# Patient Record
Sex: MALE | Race: WHITE | NOT HISPANIC OR LATINO | URBAN - METROPOLITAN AREA
[De-identification: names, ages, dates, MRNs, and addresses within clinical notes are randomized per-mention and may not be internally consistent; named-entity substitution may affect disease eponyms.]

---

## 2017-05-28 ENCOUNTER — EMERGENCY (EMERGENCY)
Facility: HOSPITAL | Age: 37
LOS: 0 days | Discharge: HOME | End: 2017-05-28

## 2017-06-28 DIAGNOSIS — Z41.1 ENCOUNTER FOR COSMETIC SURGERY: ICD-10-CM

## 2017-06-28 DIAGNOSIS — Z90.89 ACQUIRED ABSENCE OF OTHER ORGANS: ICD-10-CM

## 2017-06-28 DIAGNOSIS — Z79.899 OTHER LONG TERM (CURRENT) DRUG THERAPY: ICD-10-CM

## 2017-06-28 DIAGNOSIS — Z20.6 CONTACT WITH AND (SUSPECTED) EXPOSURE TO HUMAN IMMUNODEFICIENCY VIRUS [HIV]: ICD-10-CM

## 2017-06-28 DIAGNOSIS — R07.0 PAIN IN THROAT: ICD-10-CM

## 2018-06-01 ENCOUNTER — TRANSCRIPTION ENCOUNTER (OUTPATIENT)
Age: 38
End: 2018-06-01

## 2019-02-04 ENCOUNTER — TRANSCRIPTION ENCOUNTER (OUTPATIENT)
Age: 39
End: 2019-02-04

## 2019-05-29 ENCOUNTER — TRANSCRIPTION ENCOUNTER (OUTPATIENT)
Age: 39
End: 2019-05-29

## 2019-06-27 ENCOUNTER — TRANSCRIPTION ENCOUNTER (OUTPATIENT)
Age: 39
End: 2019-06-27

## 2020-11-04 ENCOUNTER — TRANSCRIPTION ENCOUNTER (OUTPATIENT)
Age: 40
End: 2020-11-04

## 2020-11-04 ENCOUNTER — EMERGENCY (EMERGENCY)
Facility: HOSPITAL | Age: 40
LOS: 0 days | Discharge: HOME | End: 2020-11-04
Attending: EMERGENCY MEDICINE | Admitting: EMERGENCY MEDICINE
Payer: COMMERCIAL

## 2020-11-04 VITALS
HEART RATE: 80 BPM | SYSTOLIC BLOOD PRESSURE: 114 MMHG | TEMPERATURE: 96 F | OXYGEN SATURATION: 99 % | RESPIRATION RATE: 16 BRPM | DIASTOLIC BLOOD PRESSURE: 79 MMHG

## 2020-11-04 DIAGNOSIS — Z88.0 ALLERGY STATUS TO PENICILLIN: ICD-10-CM
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
## 2020-11-04 DIAGNOSIS — M54.2 CERVICALGIA: ICD-10-CM

## 2020-11-04 DIAGNOSIS — M62.838 OTHER MUSCLE SPASM: ICD-10-CM

## 2020-11-04 DIAGNOSIS — S16.1XXA STRAIN OF MUSCLE, FASCIA AND TENDON AT NECK LEVEL, INITIAL ENCOUNTER: ICD-10-CM
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
## 2020-11-04 PROCEDURE — 99284 EMERGENCY DEPT VISIT MOD MDM: CPT

## 2020-11-04 RX ORDER — IBUPROFEN 200 MG
1 TABLET ORAL
Qty: 42 | Refills: 0
Start: 2020-11-04 | End: 2020-11-17

## 2020-11-04 RX ORDER — KETOROLAC TROMETHAMINE 30 MG/ML
30 SYRINGE (ML) INJECTION ONCE
Refills: 0 | Status: DISCONTINUED | OUTPATIENT
Start: 2020-11-04 | End: 2020-11-04

## 2020-11-04 RX ORDER — METHOCARBAMOL 500 MG/1
1 TABLET, FILM COATED ORAL
Qty: 21 | Refills: 0
Start: 2020-11-04 | End: 2020-11-10

## 2020-11-04 RX ORDER — METHOCARBAMOL 500 MG/1
1000 TABLET, FILM COATED ORAL ONCE
Refills: 0 | Status: COMPLETED | OUTPATIENT
Start: 2020-11-04 | End: 2020-11-04

## 2020-11-04 RX ADMIN — Medication 30 MILLIGRAM(S): at 12:11

## 2020-11-04 RX ADMIN — METHOCARBAMOL 1000 MILLIGRAM(S): 500 TABLET, FILM COATED ORAL at 12:11

## 2020-11-04 NOTE — ED PROVIDER NOTE - NSFOLLOWUPINSTRUCTIONS_ED_ALL_ED_FT
Cervical Sprain     A cervical sprain is a stretch or tear in the tissues that connect bones (ligaments) in the neck. Most neck (cervical) sprains get better in 4–6 weeks.  Follow these instructions at home:  If you have a neck collar:     Wear it as told by your doctor. Do not take off (do not remove) the collar unless your doctor says that this is safe.Ask your doctor before adjusting your collar.If you have long hair, keep it outside of the collar.Ask your doctor if you may take off the collar for cleaning and bathing. If you may take off the collar:  Follow instructions from your doctor about how to take off the collar safely.Clean the collar by wiping it with mild soap and water. Let it air-dry all the way.If your collar has removable pads:  Take the pads out every 1–2 days.Hand wash the pads with soap and water.Let the pads air-dry all the way before you put them back in the collar. Do not dry them in a clothes dryer. Do not dry them with a hair dryer.Check your skin under the collar for irritation or sores. If you see any, tell your doctor.Managing pain, stiffness, and swelling        Use a cervical traction device, if told by your doctor.If told, put heat on the affected area. Do this before exercises (physical therapy) or as often as told by your doctor. Use the heat source that your doctor recommends, such as a moist heat pack or a heating pad.  Place a towel between your skin and the heat source.Leave the heat on for 20–30 minutes.Take the heat off (remove the heat) if your skin turns bright red. This is very important if you cannot feel pain, heat, or cold. You may have a greater risk of getting burned.Put ice on the affected area.  Put ice in a plastic bag.Place a towel between your skin and the bag.Leave the ice on for 20 minutes, 2–3 times a day.Activity     Do not drive while wearing a neck collar. If you do not have a neck collar, ask your doctor if it is safe to drive.Do not drive or use heavy machinery while taking prescription pain medicine or muscle relaxants, unless your doctor approves.Do not lift anything that is heavier than 10 lb (4.5 kg) until your doctor tells you that it is safe.Rest as told by your doctor.Avoid activities that make you feel worse. Ask your doctor what activities are safe for you.Do exercises as told by your doctor or physical therapist.Preventing neck sprain     Practice good posture. Adjust your workstation to help with this, if needed.Exercise regularly as told by your doctor or physical therapist.Avoid activities that are risky or may cause a neck sprain (cervical sprain).General instructions     Take over-the-counter and prescription medicines only as told by your doctor.Do not use any products that contain nicotine or tobacco. This includes cigarettes and e-cigarettes. If you need help quitting, ask your doctor.Keep all follow-up visits as told by your doctor. This is important.Contact a doctor if:  You have pain or other symptoms that get worse.You have symptoms that do not get better after 2 weeks.You have pain that does not get better with medicine.You start to have new, unexplained symptoms.You have sores or irritated skin from wearing your neck collar.Get help right away if:  You have very bad pain.You have any of the following in any part of your body:  Loss of feeling (numbness).Tingling.Weakness.You cannot move a part of your body (you have paralysis).Your activity level does not improve.Summary  A cervical sprain is a stretch or tear in the tissues that connect bones (ligaments) in the neck.If you have a neck (cervical) collar, do not take off the collar unless your doctor says that this is safe.Put ice on affected areas as told by your doctor.Put heat on affected areas as told by your doctor.Good posture and regular exercise can help prevent a neck sprain from happening again.This information is not intended to replace advice given to you by your health care provider. Make sure you discuss any questions you have with your health care provider.    Document Released: 06/05/2009 Document Revised: 08/29/2017 Document Reviewed: 08/29/2017  [employer] Patient Education © 2019 [employer]

## 2020-11-04 NOTE — ED PROVIDER NOTE - PATIENT PORTAL LINK FT
You can access the FollowMyHealth Patient Portal offered by [facility] by registering at the following website: [email or web address]. By joining [employer]’s FollowMyHealth portal, you will also be able to view your health information using other applications (apps) compatible with our system.

## 2020-11-04 NOTE — ED PROVIDER NOTE - MUSCULOSKELETAL, MLM
Spine appears normal, range of motion is mildly limited due to pain, no midline tenderness.  (+) Right trapezial tenderness with spasm.

## 2020-11-04 NOTE — ED PROVIDER NOTE - CLINICAL SUMMARY MEDICAL DECISION MAKING FREE TEXT BOX
pt presenting with neck pain, atraumatic. improved sp nsaids. no fevers/chills, diff swallowing, breathing, no stridor. Well appearing, NAD, non toxic. NCAT PERRLA EOMI neck supple no midline c spine ttp, +R SCM spasm normal wob  neuro non focal. 2+ equal pulses throughout. <2sec capillary refill throughout. Comfortable with discharge and follow-up outpatient, strict return precautions given. Endorses understanding of all of this and aware that they can return at any time for new or concerning symptoms. No further questions or concerns at this time

## 2020-11-04 NOTE — ED ADULT TRIAGE NOTE - CHIEF COMPLAINT QUOTE
C/o neck pain worsening over the last week. Sent from [facility] [place].

## 2020-11-04 NOTE — ED PROVIDER NOTE - NSFOLLOWUPCLINICS_GEN_ALL_ED_FT
[facility] ([facility])  Rehabilitation  [facility] [address] [place] [address]  Phone: [phone]  Fax:   Follow Up Time: 1-3 Days

## 2020-11-04 NOTE — ED PROVIDER NOTE - OBJECTIVE STATEMENT
39 y.o. male without any PMH presented to the ER c/o Right neck pain for the past 3 days.  Pt woke up with pain.  Pain non-radiating and worse with movement.  Denies fever, chills, headache, extremity weakness/paresthesias, trauma.  No other complaints. 39 y.o. male without any PMH presented to the ER c/o Right neck pain for the past week.  Pt woke up with pain.  Pain non-radiating and worse with movement.  Denies fever, chills, headache, extremity weakness/paresthesias, trauma.  No other complaints.

## 2021-05-04 ENCOUNTER — TRANSCRIPTION ENCOUNTER (OUTPATIENT)
Age: 41
End: 2021-05-04

## 2021-07-11 ENCOUNTER — TRANSCRIPTION ENCOUNTER (OUTPATIENT)
Age: 41
End: 2021-07-11

## 2021-08-09 ENCOUNTER — TRANSCRIPTION ENCOUNTER (OUTPATIENT)
Age: 41
End: 2021-08-09

## 2021-10-04 PROBLEM — Z00.00 ENCOUNTER FOR PREVENTIVE HEALTH EXAMINATION: Status: ACTIVE | Noted: 2021-10-04

## 2021-10-13 ENCOUNTER — APPOINTMENT (OUTPATIENT)
Dept: UROLOGY | Facility: CLINIC | Age: 41
End: 2021-10-13

## 2021-10-25 ENCOUNTER — TRANSCRIPTION ENCOUNTER (OUTPATIENT)
Age: 41
End: 2021-10-25

## 2022-05-09 ENCOUNTER — NON-APPOINTMENT (OUTPATIENT)
Age: 42
End: 2022-05-09

## 2023-06-19 ENCOUNTER — NON-APPOINTMENT (OUTPATIENT)
Age: 43
End: 2023-06-19

## 2024-09-06 ENCOUNTER — NON-APPOINTMENT (OUTPATIENT)
Age: 44
End: 2024-09-06